# Patient Record
Sex: FEMALE | Race: WHITE | NOT HISPANIC OR LATINO | URBAN - METROPOLITAN AREA
[De-identification: names, ages, dates, MRNs, and addresses within clinical notes are randomized per-mention and may not be internally consistent; named-entity substitution may affect disease eponyms.]

---

## 2024-07-11 PROBLEM — S42.399A: Status: ACTIVE | Noted: 2024-07-11

## 2024-07-11 PROBLEM — Z79.1 ENCOUNTER FOR LONG-TERM (CURRENT) USE OF NSAIDS: Status: ACTIVE | Noted: 2024-07-11

## 2024-07-11 PROBLEM — I63.9 CEREBROVASCULAR ACCIDENT (CVA): Status: ACTIVE | Noted: 2024-07-11

## 2024-07-11 PROBLEM — G56.00 CARPAL TUNNEL SYNDROME: Status: ACTIVE | Noted: 2024-07-11

## 2024-07-11 PROBLEM — M19.90 OSTEOARTHRITIS: Status: ACTIVE | Noted: 2024-07-11

## 2024-07-11 PROBLEM — M85.80 OSTEOPENIA: Status: ACTIVE | Noted: 2024-07-11

## 2024-07-11 PROBLEM — M26.609 TMJ (TEMPOROMANDIBULAR JOINT SYNDROME): Status: ACTIVE | Noted: 2024-07-11

## 2024-07-11 PROBLEM — R76.8 POSITIVE ANA (ANTINUCLEAR ANTIBODY): Status: ACTIVE | Noted: 2024-07-11

## 2025-03-17 ENCOUNTER — TELEPHONE (OUTPATIENT)
Age: 65
End: 2025-03-17
Payer: MEDICARE

## 2025-04-02 ENCOUNTER — TELEPHONE (OUTPATIENT)
Dept: NEUROLOGY | Facility: OTHER | Age: 65
End: 2025-04-02
Payer: MEDICARE

## 2025-04-02 NOTE — TELEPHONE ENCOUNTER
PT CALLED TO CHECK ON REFERRAL. SHE SAID IT WAS SENT YESTERDAY. TOLD HER IT COULD TAKE 24 TO 48 HOURS TO GET IT UPLOADED TO HER CHART. SHE HAS SEEN DR LARRY AT [facility]  IN [place] [phone]. I ASKED IF THIS WAS A SECOND OPINION OR TOMASA AND SHE SAID SHE WASN'T SURE YET.

## 2025-04-07 PROBLEM — S06.0XAA CONCUSSION WITH UNKNOWN LOSS OF CONSCIOUSNESS STATUS: Status: ACTIVE | Noted: 2025-04-07

## 2025-04-07 PROBLEM — Z86.73 HISTORY OF STROKE: Status: ACTIVE | Noted: 2025-04-07

## 2025-04-07 PROBLEM — M15.9 GENERALIZED OSTEOARTHROSIS, INVOLVING MULTIPLE SITES: Status: ACTIVE | Noted: 2025-04-07
